# Patient Record
Sex: MALE | Race: WHITE | NOT HISPANIC OR LATINO | Employment: FULL TIME | URBAN - METROPOLITAN AREA
[De-identification: names, ages, dates, MRNs, and addresses within clinical notes are randomized per-mention and may not be internally consistent; named-entity substitution may affect disease eponyms.]

---

## 2023-03-01 PROBLEM — M47.816 FACET ARTHROPATHY, LUMBAR: Status: ACTIVE | Noted: 2023-03-01

## 2023-03-01 PROBLEM — M48.062 SPINAL STENOSIS OF LUMBAR REGION WITH NEUROGENIC CLAUDICATION: Status: ACTIVE | Noted: 2023-03-01

## 2023-03-01 PROBLEM — M79.641 PAIN OF RIGHT HAND: Status: ACTIVE | Noted: 2023-03-01

## 2023-03-01 PROBLEM — M51.369 DDD (DEGENERATIVE DISC DISEASE), LUMBAR: Status: ACTIVE | Noted: 2023-03-01

## 2023-03-01 PROBLEM — M51.26 LUMBAR DISC HERNIATION: Status: ACTIVE | Noted: 2023-03-01

## 2023-03-01 PROBLEM — L21.9 SEBORRHEIC DERMATITIS: Status: ACTIVE | Noted: 2023-03-01

## 2023-03-01 PROBLEM — M19.90 ARTHRITIS: Status: ACTIVE | Noted: 2023-03-01

## 2023-03-01 PROBLEM — R31.9 HEMATURIA: Status: ACTIVE | Noted: 2023-03-01

## 2023-03-01 PROBLEM — G56.22 CUBITAL TUNNEL SYNDROME ON LEFT: Status: ACTIVE | Noted: 2023-03-01

## 2023-03-01 PROBLEM — B02.9 SHINGLES: Status: ACTIVE | Noted: 2023-03-01

## 2023-03-01 PROBLEM — M54.50 LUMBAR BACK PAIN: Status: ACTIVE | Noted: 2023-03-01

## 2023-03-01 PROBLEM — M51.36 DDD (DEGENERATIVE DISC DISEASE), LUMBAR: Status: ACTIVE | Noted: 2023-03-01

## 2023-03-01 PROBLEM — S22.42XA MULTIPLE CLOSED FRACTURES OF RIBS OF LEFT SIDE: Status: ACTIVE | Noted: 2023-03-01

## 2023-03-01 PROBLEM — M72.0 DUPUYTREN'S CONTRACTURE OF HAND: Status: ACTIVE | Noted: 2023-03-01

## 2023-03-01 PROBLEM — L73.8 SEBACEOUS HYPERPLASIA OF FACE: Status: ACTIVE | Noted: 2023-03-01

## 2023-03-01 PROBLEM — M51.27 HERNIATED NUCLEUS PULPOSUS OF LUMBOSACRAL REGION: Status: ACTIVE | Noted: 2023-03-01

## 2023-03-01 PROBLEM — R21 PENILE RASH: Status: ACTIVE | Noted: 2023-03-01

## 2023-03-01 PROBLEM — M25.521 RIGHT ELBOW PAIN: Status: ACTIVE | Noted: 2023-03-01

## 2023-03-01 RX ORDER — NYSTATIN 100000 U/G
CREAM TOPICAL
COMMUNITY
Start: 2021-04-16

## 2023-03-01 RX ORDER — CLOTRIMAZOLE AND BETAMETHASONE DIPROPIONATE 10; .64 MG/G; MG/G
CREAM TOPICAL
COMMUNITY
Start: 2021-04-22

## 2023-03-20 ENCOUNTER — OFFICE VISIT (OUTPATIENT)
Dept: PRIMARY CARE | Facility: CLINIC | Age: 63
End: 2023-03-20
Payer: COMMERCIAL

## 2023-03-20 VITALS
HEART RATE: 82 BPM | OXYGEN SATURATION: 98 % | DIASTOLIC BLOOD PRESSURE: 76 MMHG | HEIGHT: 70 IN | WEIGHT: 144 LBS | TEMPERATURE: 100.2 F | SYSTOLIC BLOOD PRESSURE: 136 MMHG | BODY MASS INDEX: 20.62 KG/M2 | RESPIRATION RATE: 16 BRPM

## 2023-03-20 DIAGNOSIS — M54.50 LUMBAR BACK PAIN: ICD-10-CM

## 2023-03-20 DIAGNOSIS — R31.1 BENIGN ESSENTIAL MICROSCOPIC HEMATURIA: ICD-10-CM

## 2023-03-20 DIAGNOSIS — M48.062 SPINAL STENOSIS OF LUMBAR REGION WITH NEUROGENIC CLAUDICATION: Primary | ICD-10-CM

## 2023-03-20 PROBLEM — B02.9 SHINGLES: Status: RESOLVED | Noted: 2023-03-01 | Resolved: 2023-03-20

## 2023-03-20 PROBLEM — L73.8 SEBACEOUS HYPERPLASIA OF FACE: Status: RESOLVED | Noted: 2023-03-01 | Resolved: 2023-03-20

## 2023-03-20 PROBLEM — R21 PENILE RASH: Status: RESOLVED | Noted: 2023-03-01 | Resolved: 2023-03-20

## 2023-03-20 PROBLEM — S22.42XA MULTIPLE CLOSED FRACTURES OF RIBS OF LEFT SIDE: Status: RESOLVED | Noted: 2023-03-01 | Resolved: 2023-03-20

## 2023-03-20 PROBLEM — G56.22 CUBITAL TUNNEL SYNDROME ON LEFT: Status: RESOLVED | Noted: 2023-03-01 | Resolved: 2023-03-20

## 2023-03-20 PROBLEM — M51.26 LUMBAR DISC HERNIATION: Status: RESOLVED | Noted: 2023-03-01 | Resolved: 2023-03-20

## 2023-03-20 PROBLEM — L21.9 SEBORRHEIC DERMATITIS: Status: RESOLVED | Noted: 2023-03-01 | Resolved: 2023-03-20

## 2023-03-20 PROBLEM — M72.0 DUPUYTREN'S CONTRACTURE OF HAND: Status: RESOLVED | Noted: 2023-03-01 | Resolved: 2023-03-20

## 2023-03-20 PROBLEM — M79.641 PAIN OF RIGHT HAND: Status: RESOLVED | Noted: 2023-03-01 | Resolved: 2023-03-20

## 2023-03-20 PROBLEM — M51.27 HERNIATED NUCLEUS PULPOSUS OF LUMBOSACRAL REGION: Status: RESOLVED | Noted: 2023-03-01 | Resolved: 2023-03-20

## 2023-03-20 PROBLEM — M47.816 FACET ARTHROPATHY, LUMBAR: Status: RESOLVED | Noted: 2023-03-01 | Resolved: 2023-03-20

## 2023-03-20 PROBLEM — M25.521 RIGHT ELBOW PAIN: Status: RESOLVED | Noted: 2023-03-01 | Resolved: 2023-03-20

## 2023-03-20 PROCEDURE — 99213 OFFICE O/P EST LOW 20 MIN: CPT | Performed by: NURSE PRACTITIONER

## 2023-03-20 RX ORDER — CYCLOBENZAPRINE HCL 10 MG
10 TABLET ORAL 3 TIMES DAILY PRN
Qty: 30 TABLET | Refills: 0 | Status: SHIPPED | OUTPATIENT
Start: 2023-03-20 | End: 2023-03-30

## 2023-03-20 RX ORDER — PREDNISONE 10 MG/1
TABLET ORAL
Qty: 30 TABLET | Refills: 0 | Status: SHIPPED | OUTPATIENT
Start: 2023-03-20 | End: 2023-03-30

## 2023-03-20 RX ORDER — OXYCODONE AND ACETAMINOPHEN 5; 325 MG/1; MG/1
1 TABLET ORAL EVERY 8 HOURS PRN
Qty: 9 TABLET | Refills: 0 | Status: SHIPPED | OUTPATIENT
Start: 2023-03-20 | End: 2023-03-23

## 2023-03-20 RX ORDER — TRIAMCINOLONE ACETONIDE 40 MG/ML
80 INJECTION, SUSPENSION INTRA-ARTICULAR; INTRAMUSCULAR ONCE
Status: COMPLETED | OUTPATIENT
Start: 2023-03-20 | End: 2023-03-28

## 2023-03-20 ASSESSMENT — ENCOUNTER SYMPTOMS
EYES NEGATIVE: 1
CONSTITUTIONAL NEGATIVE: 1
BACK PAIN: 1
RESPIRATORY NEGATIVE: 1
CARDIOVASCULAR NEGATIVE: 1
GASTROINTESTINAL NEGATIVE: 1
HEMATURIA: 1

## 2023-03-20 ASSESSMENT — PAIN SCALES - GENERAL: PAINLEVEL: 6

## 2023-03-20 NOTE — PATIENT INSTRUCTIONS
1. Spinal stenosis of lumbar region with neurogenic claudication     Steroid shot today  Taper Steroid dose  Muscle relaxer  Pain Medication  If not improved then a referral back to Dr Conway     2. Benign essential microscopic hematuria     Checking blood work    3. Lumbar back pain     Steroid shot today  Taper Steroid dose  Muscle relaxer  Pain Medication  If not improved then a referral back to Dr Conway    I have ordered fasting blood work - please wait 3-4 weeks - I will call with results

## 2023-03-20 NOTE — PROGRESS NOTES
"Subjective   Patient ID: Siva Priest is a 62 y.o. male who presents for Annual Exam and Sciatica (pain).    Back Pain  Patient presents for evaluation of low back problems. Symptoms have been present for 8 months and include pain in left lower back and into the leg (burning, numbing, shooting, and tingling in character; 8/10 in severity at the worst average of 5 and goes away ) and paresthesias in left leg. Initial inciting event: none. Symptoms are worse with rain but time of day does not matter Alleviating factors identifiable by the patient are squatting and bending over. Aggravating factors identifiable by the patient are bending forwards, standing, and walking. Treatments initiated by the patient: ibuprofen, tylenol no real help inversion table helps a little. Previous lower back problems: right low back pain . Previous work up: none. Previous treatments: previous steroids and he had a microdiskectomy of the right L4-5 with Dr Conway           Review of Systems   Constitutional: Negative.    HENT: Negative.     Eyes: Negative.    Respiratory: Negative.     Cardiovascular: Negative.    Gastrointestinal: Negative.    Genitourinary:  Positive for hematuria.        Has been worked up for hematuria by Dr Valadez   Musculoskeletal:  Positive for back pain and gait problem.        As noted in HPI.   Skin: Negative.        Objective   /76   Pulse 82   Temp 37.9 °C (100.2 °F)   Resp 16   Ht 1.778 m (5' 10\")   Wt 65.3 kg (144 lb)   SpO2 98%   BMI 20.66 kg/m²     Physical Exam  HENT:      Head: Normocephalic and atraumatic.   Eyes:      Conjunctiva/sclera: Conjunctivae normal.   Cardiovascular:      Rate and Rhythm: Normal rate and regular rhythm.      Pulses: Normal pulses.   Pulmonary:      Effort: Pulmonary effort is normal. No respiratory distress.      Breath sounds: No wheezing.   Abdominal:      General: Abdomen is flat. Bowel sounds are normal.   Musculoskeletal:         General: Tenderness " present.      Lumbar back: Decreased range of motion.      Comments: Restricted forward flexion Rest of ROM WNL   Neurological:      General: No focal deficit present.      Mental Status: He is alert and oriented to person, place, and time.       Assessment/Plan   Problem List Items Addressed This Visit          Musculoskeletal    Spinal stenosis of lumbar region with neurogenic claudication - Primary       Other    Hematuria    Relevant Orders    Comprehensive Metabolic Panel    Prostate Specific Antigen    TSH with reflex to Free T4 if abnormal    Lipid Panel    CBC and Auto Differential    Lumbar back pain    Relevant Medications    triamcinolone acetonide (Kenalog-40) injection 80 mg    predniSONE (Deltasone) 10 mg tablet    cyclobenzaprine (Flexeril) 10 mg tablet    oxyCODONE-acetaminophen (Percocet) 5-325 mg tablet    Other Relevant Orders    Referral to Physical Therapy

## 2023-03-28 PROCEDURE — 96372 THER/PROPH/DIAG INJ SC/IM: CPT | Performed by: NURSE PRACTITIONER

## 2023-03-28 RX ADMIN — TRIAMCINOLONE ACETONIDE 80 MG: 40 INJECTION, SUSPENSION INTRA-ARTICULAR; INTRAMUSCULAR at 09:58

## 2023-07-12 LAB
ALANINE AMINOTRANSFERASE (SGPT) (U/L) IN SER/PLAS: 10 U/L (ref 10–52)
ALBUMIN (G/DL) IN SER/PLAS: 4.5 G/DL (ref 3.4–5)
ALKALINE PHOSPHATASE (U/L) IN SER/PLAS: 40 U/L (ref 33–136)
ANION GAP IN SER/PLAS: 15 MMOL/L (ref 10–20)
ASPARTATE AMINOTRANSFERASE (SGOT) (U/L) IN SER/PLAS: 18 U/L (ref 9–39)
BASOPHILS (10*3/UL) IN BLOOD BY AUTOMATED COUNT: 0.05 X10E9/L (ref 0–0.1)
BASOPHILS/100 LEUKOCYTES IN BLOOD BY AUTOMATED COUNT: 1.2 % (ref 0–2)
BILIRUBIN TOTAL (MG/DL) IN SER/PLAS: 0.8 MG/DL (ref 0–1.2)
CALCIUM (MG/DL) IN SER/PLAS: 9.3 MG/DL (ref 8.6–10.3)
CARBON DIOXIDE, TOTAL (MMOL/L) IN SER/PLAS: 27 MMOL/L (ref 21–32)
CHLORIDE (MMOL/L) IN SER/PLAS: 102 MMOL/L (ref 98–107)
CHOLESTEROL (MG/DL) IN SER/PLAS: 192 MG/DL (ref 0–199)
CHOLESTEROL IN HDL (MG/DL) IN SER/PLAS: 67.1 MG/DL
CHOLESTEROL/HDL RATIO: 2.9
CREATININE (MG/DL) IN SER/PLAS: 0.81 MG/DL (ref 0.5–1.3)
EOSINOPHILS (10*3/UL) IN BLOOD BY AUTOMATED COUNT: 0.27 X10E9/L (ref 0–0.7)
EOSINOPHILS/100 LEUKOCYTES IN BLOOD BY AUTOMATED COUNT: 6.4 % (ref 0–6)
ERYTHROCYTE DISTRIBUTION WIDTH (RATIO) BY AUTOMATED COUNT: 12.6 % (ref 11.5–14.5)
ERYTHROCYTE MEAN CORPUSCULAR HEMOGLOBIN CONCENTRATION (G/DL) BY AUTOMATED: 33.3 G/DL (ref 32–36)
ERYTHROCYTE MEAN CORPUSCULAR VOLUME (FL) BY AUTOMATED COUNT: 97 FL (ref 80–100)
ERYTHROCYTES (10*6/UL) IN BLOOD BY AUTOMATED COUNT: 5.06 X10E12/L (ref 4.5–5.9)
GFR MALE: >90 ML/MIN/1.73M2
GLUCOSE (MG/DL) IN SER/PLAS: 113 MG/DL (ref 74–99)
HEMATOCRIT (%) IN BLOOD BY AUTOMATED COUNT: 49.3 % (ref 41–52)
HEMOGLOBIN (G/DL) IN BLOOD: 16.4 G/DL (ref 13.5–17.5)
IMMATURE GRANULOCYTES/100 LEUKOCYTES IN BLOOD BY AUTOMATED COUNT: 0.2 % (ref 0–0.9)
LDL: 114 MG/DL (ref 0–99)
LEUKOCYTES (10*3/UL) IN BLOOD BY AUTOMATED COUNT: 4.2 X10E9/L (ref 4.4–11.3)
LYMPHOCYTES (10*3/UL) IN BLOOD BY AUTOMATED COUNT: 1.44 X10E9/L (ref 1.2–4.8)
LYMPHOCYTES/100 LEUKOCYTES IN BLOOD BY AUTOMATED COUNT: 34 % (ref 13–44)
MONOCYTES (10*3/UL) IN BLOOD BY AUTOMATED COUNT: 0.33 X10E9/L (ref 0.1–1)
MONOCYTES/100 LEUKOCYTES IN BLOOD BY AUTOMATED COUNT: 7.8 % (ref 2–10)
NEUTROPHILS (10*3/UL) IN BLOOD BY AUTOMATED COUNT: 2.13 X10E9/L (ref 1.2–7.7)
NEUTROPHILS/100 LEUKOCYTES IN BLOOD BY AUTOMATED COUNT: 50.4 % (ref 40–80)
PLATELETS (10*3/UL) IN BLOOD AUTOMATED COUNT: 226 X10E9/L (ref 150–450)
POTASSIUM (MMOL/L) IN SER/PLAS: 4.5 MMOL/L (ref 3.5–5.3)
PROSTATE SPECIFIC AG (NG/ML) IN SER/PLAS: 0.22 NG/ML (ref 0–4)
PROTEIN TOTAL: 7.1 G/DL (ref 6.4–8.2)
SODIUM (MMOL/L) IN SER/PLAS: 139 MMOL/L (ref 136–145)
THYROTROPIN (MIU/L) IN SER/PLAS BY DETECTION LIMIT <= 0.05 MIU/L: 1.07 MIU/L (ref 0.44–3.98)
TRIGLYCERIDE (MG/DL) IN SER/PLAS: 54 MG/DL (ref 0–149)
UREA NITROGEN (MG/DL) IN SER/PLAS: 12 MG/DL (ref 6–23)
VLDL: 11 MG/DL (ref 0–40)

## 2023-07-19 ENCOUNTER — TELEPHONE (OUTPATIENT)
Dept: PRIMARY CARE | Facility: CLINIC | Age: 63
End: 2023-07-19
Payer: COMMERCIAL

## 2023-07-19 NOTE — TELEPHONE ENCOUNTER
----- Message from Kenya Luna MD sent at 7/17/2023  5:39 PM EDT -----  All labs were essentially within normal limits.

## 2023-08-07 ENCOUNTER — TELEPHONE (OUTPATIENT)
Dept: PRIMARY CARE | Facility: CLINIC | Age: 63
End: 2023-08-07
Payer: COMMERCIAL

## 2023-08-07 DIAGNOSIS — M54.16 LUMBAR RADICULOPATHY: Primary | ICD-10-CM

## 2023-08-07 NOTE — TELEPHONE ENCOUNTER
He is having Sciatic pain on his left side and is asking for a referral to Dr. Mcleod.     He had seen his a few years ago for his Sciatic pain on the right side and he was able to repair it.

## 2023-08-10 ENCOUNTER — APPOINTMENT (OUTPATIENT)
Dept: PRIMARY CARE | Facility: CLINIC | Age: 63
End: 2023-08-10
Payer: COMMERCIAL

## 2023-09-08 ENCOUNTER — APPOINTMENT (OUTPATIENT)
Dept: PRIMARY CARE | Facility: CLINIC | Age: 63
End: 2023-09-08
Payer: COMMERCIAL

## 2023-09-28 ENCOUNTER — OFFICE VISIT (OUTPATIENT)
Dept: PRIMARY CARE | Facility: CLINIC | Age: 63
End: 2023-09-28
Payer: COMMERCIAL

## 2023-09-28 VITALS
WEIGHT: 140.2 LBS | DIASTOLIC BLOOD PRESSURE: 79 MMHG | SYSTOLIC BLOOD PRESSURE: 117 MMHG | RESPIRATION RATE: 18 BRPM | HEART RATE: 74 BPM | BODY MASS INDEX: 20.12 KG/M2 | TEMPERATURE: 98.8 F | OXYGEN SATURATION: 95 %

## 2023-09-28 DIAGNOSIS — Z00.00 HEALTH MAINTENANCE EXAMINATION: Primary | ICD-10-CM

## 2023-09-28 DIAGNOSIS — F17.219 CIGARETTE NICOTINE DEPENDENCE WITH NICOTINE-INDUCED DISORDER: ICD-10-CM

## 2023-09-28 PROCEDURE — 99396 PREV VISIT EST AGE 40-64: CPT | Performed by: FAMILY MEDICINE

## 2023-09-28 PROCEDURE — 4004F PT TOBACCO SCREEN RCVD TLK: CPT | Performed by: FAMILY MEDICINE

## 2023-09-28 ASSESSMENT — ENCOUNTER SYMPTOMS
CONSTITUTIONAL NEGATIVE: 1
EYES NEGATIVE: 1
ENDOCRINE NEGATIVE: 1
HEMATOLOGIC/LYMPHATIC NEGATIVE: 1
NEUROLOGICAL NEGATIVE: 1
PSYCHIATRIC NEGATIVE: 1
CARDIOVASCULAR NEGATIVE: 1
BACK PAIN: 1
RESPIRATORY NEGATIVE: 1
ALLERGIC/IMMUNOLOGIC NEGATIVE: 1
GASTROINTESTINAL NEGATIVE: 1

## 2023-09-28 NOTE — PROGRESS NOTES
Subjective   Patient ID: Siva Priest is a 63 y.o. male who presents for No chief complaint on file..    Patient here for yearly exam. No concerns.     Is having Back surgery November 7th, bone spur removal to take pressure off sciatica.  Has been going to physical therapy since April, has gotten better strength wise but pain remains.     States had colonoscopy 2 years ago.   Would like lose dose CT scan, denies wanting to stop smoking. 20 pack year smoker. Educated on smoking cessation,     Denies influenza and pneumonia vaccine.          Review of Systems   Constitutional: Negative.    HENT: Negative.     Eyes: Negative.    Respiratory: Negative.     Cardiovascular: Negative.    Gastrointestinal: Negative.    Endocrine: Negative.    Genitourinary: Negative.    Musculoskeletal:  Positive for back pain.        See HPI   Skin: Negative.    Allergic/Immunologic: Negative.    Neurological: Negative.    Hematological: Negative.    Psychiatric/Behavioral: Negative.         Objective   /79   Pulse 74   Temp 37.1 °C (98.8 °F)   Resp 18   Wt 63.6 kg (140 lb 3.2 oz)   SpO2 95%   BMI 20.12 kg/m²     Physical Exam  Constitutional:       Appearance: Normal appearance.   HENT:      Head: Normocephalic.   Cardiovascular:      Rate and Rhythm: Normal rate and regular rhythm.      Pulses: Normal pulses.      Heart sounds: Normal heart sounds.   Pulmonary:      Effort: Pulmonary effort is normal.      Breath sounds: Normal breath sounds.   Abdominal:      General: Bowel sounds are normal.      Palpations: Abdomen is soft.   Musculoskeletal:         General: Normal range of motion.   Skin:     General: Skin is warm.      Capillary Refill: Capillary refill takes less than 2 seconds.   Neurological:      General: No focal deficit present.      Mental Status: He is alert and oriented to person, place, and time. Mental status is at baseline.   Psychiatric:         Mood and Affect: Mood normal.         Behavior:  Behavior normal.         Thought Content: Thought content normal.         Judgment: Judgment normal.       This note was completed by Madyson Hand FNP student acting as a scribe for Kirsten Landers CNP    Assessment/Plan   Problem List Items Addressed This Visit    None  Visit Diagnoses         Codes    Health maintenance examination    -  Primary Z00.00    Cigarette nicotine dependence with nicotine-induced disorder     F17.219    Relevant Orders    CT lung screening low dose

## 2023-10-31 ENCOUNTER — TELEPHONE (OUTPATIENT)
Dept: PRIMARY CARE | Facility: CLINIC | Age: 63
End: 2023-10-31
Payer: COMMERCIAL

## 2023-10-31 NOTE — TELEPHONE ENCOUNTER
----- Message from TREY Armendariz-CNP sent at 10/31/2023  1:54 PM EDT -----  I have received a request from [facility] for preop clearance for his surgery on 11/7 however the clearance form had wrong patients information, can you call them and have them fax a corrected clearance form?

## 2023-11-02 ENCOUNTER — DOCUMENTATION (OUTPATIENT)
Dept: PRIMARY CARE | Facility: CLINIC | Age: 63
End: 2023-11-02
Payer: COMMERCIAL

## 2023-11-02 LAB
ANION GAP IN SER/PLAS EXTERNAL: 10 MMOL/L
CALCIUM (MG/DL) IN SER/PLAS EXTERNAL: 8.7 MG/DL
CARBON DIOXIDE, TOTAL (MMOL/L) IN SER/PLAS EXTERNAL: 26 MMOL/L
CHLORIDE (MMOL/L) IN SER/PLAS EXTERNAL: 102 MMOL/L
CREATININE (MG/DL) IN SER/PLAS EXTERNAL: 0.88 MG/DL
ERYTHROCYTE MEAN CORPUSCULAR HEMOGLOBIN (PG) BY AUTOMATED COUNT EXTERNAL: 35.3 PG
ERYTHROCYTE MEAN CORPUSCULAR HGB CONCENTRATION (G/DL) BY AUTOMATED EXT: 33.3 G/DL
ERYTHROCYTE MEAN CORPUSCULAR VOLUME (FL) BY AUTOMATED COUNT EXTERNAL: 106 FL
ERYTHROCYTES (10*6/UL) IN BLOOD BY AUTOMATED COUNT EXTERNAL: 4.48 X10*6/UL
GLUCOSE (MG/DL) IN SER/PLAS EXTERNAL: 79 MG/DL
HEMATOCRIT (%) IN BLOOD BY AUTOMATED COUNT EXTERNAL: 47.4 %
HEMOGLOBIN (G/DL) IN BLOOD EXTERNAL: 15.8 G/DL
LEUKOCYTES (10*3/UL) IN BLOOD BY AUTOMATED COUNT EXTERNAL: 4.3 X10*3/UL
PLATELET MEAN VOLUME (FL) IN BLOOD BY AUTOMATED COUNT EXTERNAL: 8.1 FL
PLATELETS (10*3/UL) IN BLOOD AUTOMATED COUNT EXTERNAL: 175 X10*3/UL
POTASSIUM (MMOL/L) IN SER/PLAS EXTERNAL: 4.7 MMOL/L
SODIUM (MMOL/L) IN SER/PLAS EXTERNAL: 138 MMOL/L
UREA NITROGEN (MG/DL) IN SER/PLAS EXTERNAL: 11 MG/DL

## 2025-04-08 ENCOUNTER — HOSPITAL ENCOUNTER (OUTPATIENT)
Dept: RADIOLOGY | Facility: HOSPITAL | Age: 65
Discharge: HOME | End: 2025-04-08
Payer: COMMERCIAL

## 2025-04-08 ENCOUNTER — APPOINTMENT (OUTPATIENT)
Dept: PRIMARY CARE | Facility: CLINIC | Age: 65
End: 2025-04-08
Payer: COMMERCIAL

## 2025-04-08 VITALS
HEART RATE: 98 BPM | BODY MASS INDEX: 19.5 KG/M2 | HEIGHT: 70 IN | WEIGHT: 136.2 LBS | OXYGEN SATURATION: 99 % | RESPIRATION RATE: 16 BRPM | SYSTOLIC BLOOD PRESSURE: 128 MMHG | DIASTOLIC BLOOD PRESSURE: 80 MMHG

## 2025-04-08 DIAGNOSIS — Z72.0 TOBACCO USER: ICD-10-CM

## 2025-04-08 DIAGNOSIS — R73.9 ELEVATED RANDOM BLOOD GLUCOSE LEVEL: Primary | ICD-10-CM

## 2025-04-08 DIAGNOSIS — F10.90 ALCOHOL USE: ICD-10-CM

## 2025-04-08 DIAGNOSIS — R10.84 GENERALIZED ABDOMINAL PAIN: ICD-10-CM

## 2025-04-08 DIAGNOSIS — Z12.11 COLON CANCER SCREENING: ICD-10-CM

## 2025-04-08 DIAGNOSIS — R35.1 NOCTURIA: ICD-10-CM

## 2025-04-08 DIAGNOSIS — E78.2 MIXED HYPERLIPIDEMIA: ICD-10-CM

## 2025-04-08 DIAGNOSIS — Z00.00 HEALTH CARE MAINTENANCE: ICD-10-CM

## 2025-04-08 PROCEDURE — 99396 PREV VISIT EST AGE 40-64: CPT

## 2025-04-08 PROCEDURE — 3008F BODY MASS INDEX DOCD: CPT

## 2025-04-08 PROCEDURE — 76700 US EXAM ABDOM COMPLETE: CPT | Performed by: RADIOLOGY

## 2025-04-08 PROCEDURE — 4004F PT TOBACCO SCREEN RCVD TLK: CPT

## 2025-04-08 PROCEDURE — 76700 US EXAM ABDOM COMPLETE: CPT

## 2025-04-08 PROCEDURE — 99214 OFFICE O/P EST MOD 30 MIN: CPT

## 2025-04-08 RX ORDER — OMEPRAZOLE 20 MG/1
20 CAPSULE, DELAYED RELEASE ORAL DAILY
Qty: 30 CAPSULE | Refills: 1 | Status: SHIPPED | OUTPATIENT
Start: 2025-04-08 | End: 2025-06-07

## 2025-04-08 ASSESSMENT — ENCOUNTER SYMPTOMS
NERVOUS/ANXIOUS: 1
ENDOCRINE NEGATIVE: 1
ABDOMINAL PAIN: 1
ROS GI COMMENTS: SEE HPI
HEMATOLOGIC/LYMPHATIC NEGATIVE: 1
OCCASIONAL FEELINGS OF UNSTEADINESS: 0
CONSTITUTIONAL NEGATIVE: 1
DEPRESSION: 0
MUSCULOSKELETAL NEGATIVE: 1
LOSS OF SENSATION IN FEET: 0
DIARRHEA: 1
RESPIRATORY NEGATIVE: 1
CARDIOVASCULAR NEGATIVE: 1
ALLERGIC/IMMUNOLOGIC NEGATIVE: 1
EYES NEGATIVE: 1
NEUROLOGICAL NEGATIVE: 1

## 2025-04-08 ASSESSMENT — PATIENT HEALTH QUESTIONNAIRE - PHQ9
10. IF YOU CHECKED OFF ANY PROBLEMS, HOW DIFFICULT HAVE THESE PROBLEMS MADE IT FOR YOU TO DO YOUR WORK, TAKE CARE OF THINGS AT HOME, OR GET ALONG WITH OTHER PEOPLE: SOMEWHAT DIFFICULT
SUM OF ALL RESPONSES TO PHQ9 QUESTIONS 1 AND 2: 2
2. FEELING DOWN, DEPRESSED OR HOPELESS: SEVERAL DAYS
1. LITTLE INTEREST OR PLEASURE IN DOING THINGS: SEVERAL DAYS

## 2025-04-08 ASSESSMENT — ANXIETY QUESTIONNAIRES
3. WORRYING TOO MUCH ABOUT DIFFERENT THINGS: SEVERAL DAYS
GAD7 TOTAL SCORE: 6
6. BECOMING EASILY ANNOYED OR IRRITABLE: SEVERAL DAYS
1. FEELING NERVOUS, ANXIOUS, OR ON EDGE: SEVERAL DAYS
IF YOU CHECKED OFF ANY PROBLEMS ON THIS QUESTIONNAIRE, HOW DIFFICULT HAVE THESE PROBLEMS MADE IT FOR YOU TO DO YOUR WORK, TAKE CARE OF THINGS AT HOME, OR GET ALONG WITH OTHER PEOPLE: NOT DIFFICULT AT ALL
7. FEELING AFRAID AS IF SOMETHING AWFUL MIGHT HAPPEN: SEVERAL DAYS
2. NOT BEING ABLE TO STOP OR CONTROL WORRYING: SEVERAL DAYS
4. TROUBLE RELAXING: SEVERAL DAYS
5. BEING SO RESTLESS THAT IT IS HARD TO SIT STILL: NOT AT ALL

## 2025-04-08 ASSESSMENT — PAIN SCALES - GENERAL: PAINLEVEL_OUTOF10: 6

## 2025-04-08 ASSESSMENT — COLUMBIA-SUICIDE SEVERITY RATING SCALE - C-SSRS
6. HAVE YOU EVER DONE ANYTHING, STARTED TO DO ANYTHING, OR PREPARED TO DO ANYTHING TO END YOUR LIFE?: NO
1. IN THE PAST MONTH, HAVE YOU WISHED YOU WERE DEAD OR WISHED YOU COULD GO TO SLEEP AND NOT WAKE UP?: NO
2. HAVE YOU ACTUALLY HAD ANY THOUGHTS OF KILLING YOURSELF?: NO

## 2025-04-08 NOTE — ASSESSMENT & PLAN NOTE
Blood work ordered.   Colonoscopy ordered  Declines recommended immunizations  Tdap 2018    During the course of the visit the patient was educated and counseled about age appropriate screening and preventive services. Completed preventive screenings were documented in the chart and orders were placed for outstanding screenings/procedures as documented in the Assessment and Plan.     Patient Instructions (the written plan) was given to the patient at check out that include any community based lifestyle interventions.     Other risk factors and conditions for which interventions are recommended are addressed as the other tagged diagnoses in this encounter.

## 2025-04-08 NOTE — ASSESSMENT & PLAN NOTE
"3 months ago, was at a friends house and had peppers/onions the next day stomach pain/bloating for the next two days with stools that look like \"Chew tobacco\". Some days can eat just fine without a problem steak, hamburger, other days can only tolerate english muffin. Mid upper abdomen, pain does not radiate, bloating/gassy describes it at feeling heavy, with either daily loose stools or soft happens daily. On average two stools daily.   Has been taking Mylanta for the past few days, has helped some. Has tried gasx without any effect.  Denies blood in stools. Has notices mucous in stools. Denies cramping pain.   Upon assessment bowels hyperactive, RUQ pain with palpitation.   Lab work ordered, abdominal ultrasound ordered.   Omeprazole 20mg for 6 weeks ordered, diet discussed. [place] diet discussed, avoid spicy, fatty foods, raw vegetables, caffeine, alcohol, fired foods.   "

## 2025-04-08 NOTE — PATIENT INSTRUCTIONS
I want you to take the omeprazole every morning for 6 weeks 30 mins prior to first meal. We will follow up in 8 weeks.   Ultrasound of Abdomen ordered.   [place] diet discussed, avoid spicy, fatty foods, raw vegetables, caffeine, alcohol, fired foods.     I am ordering labs for you to get when you are fasting (meaning nothing to eat or drink for at least 12 hours before, water and black coffee are okay). Once we get the results, someone from the office will call you. If you do not hear from someone within one week of having your blood drawn, please call us [phone] so that we can go over the results.    It is recommend that you consume a balanced diet to include: fruits, a variety of vegetables (dark green, red and orange, legumes like beans and peas, starch, other), grains (at least half of which are whole grains), fat-free or low-fat dairy including milk,cheese, or fortified soy beverages, and proteins such as lean means, poultry, fish, eggs, nuts, seeds.   Limit processed foods, saturated and trans fats, added sugars and sodium (salt).     It is recommended that you get at least 150min exercise every week. More is even better. Moderate activities are activities that get your heart beating faster but you are still able to carry on a conversation. Vigorous activities will have you take breaths after a few words. You should also include two days of muscle strengthening activities to include all major muscle groups (arms, shoulders, chest, abdomen, back, hips and legs)    Benefits of keeping active include:  Lowering your risk of developing type II diabetes and some cancers  Control your blood pressure  Maintain a healthy weight  Improving mood and managing stress  Improving sleep

## 2025-04-08 NOTE — PROGRESS NOTES
"Subjective   Patient ID: Siva Priest is a 64 y.o. male who presents for Annual Exam and Bloated (Nausea after couple bites loose stool x 3 months).    Past Medical, Surgical, and Family History reviewed and updated in chart.     Reviewed all medications by prescribing practitioner or clinical pharmacist (such as prescriptions, OTCs, herbal therapies and supplements) and documented in the medical record.    HPI   64 yom in office to establish care and annual exam. PMH of spinal stenosis of lumbar spine.   Due for blood work.   Endorses colonoscopy maybe 6-7 years ago, there is no records on file.   3 months ago, was at a friends house and had peppers/onions the next day stomach pain/bloating for the next two days with stools that look like \"Chew tobacco\". Some days can eat just fine without a problem steak, hamburger, other days can only tolerate english muffin. Mid upper abdomen, pain does not radiate, bloating/gassy describes it at feeling heavy, with either daily loose stools or soft happens daily. On average two stools daily.   Has been taking Mylanta for the past few days, has helped some. Has tried gasx without any effect.  Denies blood in stools. Has notices mucous in stools. Denies cramping pain.     Anxiety mainly right before bed, it has gotten better since retired. Does not feel it is bad enough he would like to be on medication or CBT. Did discuss coping mechanisms with patient.     Review of Systems   Constitutional: Negative.    HENT: Negative.     Eyes: Negative.    Respiratory: Negative.     Cardiovascular: Negative.    Gastrointestinal:  Positive for abdominal pain and diarrhea.        See HPI   Endocrine: Negative.    Genitourinary: Negative.    Musculoskeletal: Negative.    Skin: Negative.    Allergic/Immunologic: Negative.    Neurological: Negative.    Hematological: Negative.    Psychiatric/Behavioral:  The patient is nervous/anxious.    All other systems reviewed and are " "negative.      Objective   /80 (BP Location: Left arm, Patient Position: Sitting, BP Cuff Size: Adult)   Pulse 98   Resp 16   Ht 1.778 m (5' 10\")   Wt 61.8 kg (136 lb 3.2 oz)   SpO2 99%   BMI 19.54 kg/m²     Physical Exam  Constitutional:       Appearance: Normal appearance.   HENT:      Head: Normocephalic and atraumatic.      Nose: Nose normal.      Mouth/Throat:      Mouth: Mucous membranes are moist.      Pharynx: Oropharynx is clear.   Eyes:      Pupils: Pupils are equal, round, and reactive to light.   Cardiovascular:      Rate and Rhythm: Normal rate and regular rhythm.      Pulses: Normal pulses.      Heart sounds: Normal heart sounds.   Pulmonary:      Effort: Pulmonary effort is normal.      Breath sounds: Normal breath sounds.   Abdominal:      General: Bowel sounds are increased.      Tenderness: There is generalized abdominal tenderness and tenderness in the right upper quadrant.   Musculoskeletal:         General: Normal range of motion.      Cervical back: Normal range of motion.   Skin:     General: Skin is warm and dry.   Neurological:      General: No focal deficit present.      Mental Status: He is alert and oriented to person, place, and time.   Psychiatric:         Mood and Affect: Mood normal.         Behavior: Behavior normal.         Thought Content: Thought content normal.         Judgment: Judgment normal.         Assessment/Plan   Problem List Items Addressed This Visit       Generalized abdominal pain     3 months ago, was at a friends house and had peppers/onions the next day stomach pain/bloating for the next two days with stools that look like \"Chew tobacco\". Some days can eat just fine without a problem steak, hamburger, other days can only tolerate english muffin. Mid upper abdomen, pain does not radiate, bloating/gassy describes it at feeling heavy, with either daily loose stools or soft happens daily. On average two stools daily.   Has been taking Mylanta for the past " few days, has helped some. Has tried gasx without any effect.  Denies blood in stools. Has notices mucous in stools. Denies cramping pain.   Upon assessment bowels hyperactive, RUQ pain with palpitation.   Lab work ordered, abdominal ultrasound ordered.   Omeprazole 20mg for 6 weeks ordered, diet discussed. [place] diet discussed, avoid spicy, fatty foods, raw vegetables, caffeine, alcohol, fired foods.          Relevant Medications    omeprazole (PriLOSEC) 20 mg DR capsule    Other Relevant Orders    US abdomen complete    Follow Up In [facility] - PCP - Established    Health care maintenance     Blood work ordered.   Colonoscopy ordered  Declines recommended immunizations  Tdap 2018    During the course of the visit the patient was educated and counseled about age appropriate screening and preventive services. Completed preventive screenings were documented in the chart and orders were placed for outstanding screenings/procedures as documented in the Assessment and Plan.     Patient Instructions (the written plan) was given to the patient at check out that include any community based lifestyle interventions.     Other risk factors and conditions for which interventions are recommended are addressed as the other tagged diagnoses in this encounter.          Relevant Orders    Follow Up In [facility] - PCP - Established    Tobacco user     CT lung screening ordered. Smoking cessation discussed.          Relevant Orders    CT lung screening low dose    Alcohol use     Other Visit Diagnoses       Elevated random blood glucose level    -  Primary    Relevant Orders    Hemoglobin A1C    Mixed hyperlipidemia        Relevant Orders    Lipid Panel    TSH with reflex to Free T4 if abnormal    Body mass index (BMI) 19.9 or less, adult        Relevant Orders    CBC and Auto Differential    Comprehensive Metabolic Panel    Nocturia        Relevant Orders    PSA, total and free    Colon cancer screening         Relevant Orders    Colonoscopy Screening; Average Risk Patient          Patient Counseling:  --Nutrition: Stressed importance of moderation in sodium/caffeine intake, saturated fat and cholesterol, caloric balance, sufficient intake of fresh fruits, vegetables, fiber, calcium, iron, and 1 mg of folate supplement per day (for females capable of pregnancy).  --Exercise: Stressed the importance of regular exercise.   --Substance Abuse: Discussed cessation/primary prevention of tobacco, alcohol, or other drug use; driving or other dangerous activities under the influence; availability of treatment for abuse.    --Sexuality: Discussed sexually transmitted diseases, partner selection, use of condoms, avoidance of unintended pregnancy  and contraceptive alternatives.   --Injury prevention: Discussed safety belts, safety helmets, smoke detector, smoking near bedding or upholstery.   --Dental health: Discussed importance of regular tooth brushing, flossing, and dental visits.  --Immunizations reviewed.  --Discussed benefits of screening colonoscopy.  --After hours service discussed with patient

## 2025-04-09 ENCOUNTER — TELEPHONE (OUTPATIENT)
Facility: CLINIC | Age: 65
End: 2025-04-09
Payer: COMMERCIAL

## 2025-04-09 LAB
ALBUMIN SERPL-MCNC: 5.6 G/DL (ref 3.6–5.1)
ALP SERPL-CCNC: 43 U/L (ref 35–144)
ALT SERPL-CCNC: 23 U/L (ref 9–46)
ANION GAP SERPL CALCULATED.4IONS-SCNC: 11 MMOL/L (CALC) (ref 7–17)
AST SERPL-CCNC: 28 U/L (ref 10–35)
BASOPHILS # BLD AUTO: 38 CELLS/UL (ref 0–200)
BASOPHILS NFR BLD AUTO: 0.7 %
BILIRUB SERPL-MCNC: 0.8 MG/DL (ref 0.2–1.2)
BUN SERPL-MCNC: 10 MG/DL (ref 7–25)
CALCIUM SERPL-MCNC: 10.5 MG/DL (ref 8.6–10.3)
CHLORIDE SERPL-SCNC: 99 MMOL/L (ref 98–110)
CHOLEST SERPL-MCNC: 205 MG/DL
CHOLEST/HDLC SERPL: 2.9 (CALC)
CO2 SERPL-SCNC: 29 MMOL/L (ref 20–32)
CREAT SERPL-MCNC: 0.89 MG/DL (ref 0.7–1.35)
EGFRCR SERPLBLD CKD-EPI 2021: 96 ML/MIN/1.73M2
EOSINOPHIL # BLD AUTO: 119 CELLS/UL (ref 15–500)
EOSINOPHIL NFR BLD AUTO: 2.2 %
ERYTHROCYTE [DISTWIDTH] IN BLOOD BY AUTOMATED COUNT: 12.2 % (ref 11–15)
EST. AVERAGE GLUCOSE BLD GHB EST-MCNC: 100 MG/DL
EST. AVERAGE GLUCOSE BLD GHB EST-SCNC: 5.5 MMOL/L
GLUCOSE SERPL-MCNC: 97 MG/DL (ref 65–99)
HBA1C MFR BLD: 5.1 % OF TOTAL HGB
HCT VFR BLD AUTO: 51.5 % (ref 38.5–50)
HDLC SERPL-MCNC: 70 MG/DL
HGB BLD-MCNC: 18 G/DL (ref 13.2–17.1)
LDLC SERPL CALC-MCNC: 116 MG/DL (CALC)
LYMPHOCYTES # BLD AUTO: 1172 CELLS/UL (ref 850–3900)
LYMPHOCYTES NFR BLD AUTO: 21.7 %
MCH RBC QN AUTO: 33.7 PG (ref 27–33)
MCHC RBC AUTO-ENTMCNC: 35 G/DL (ref 32–36)
MCV RBC AUTO: 96.4 FL (ref 80–100)
MONOCYTES # BLD AUTO: 508 CELLS/UL (ref 200–950)
MONOCYTES NFR BLD AUTO: 9.4 %
NEUTROPHILS # BLD AUTO: 3564 CELLS/UL (ref 1500–7800)
NEUTROPHILS NFR BLD AUTO: 66 %
NONHDLC SERPL-MCNC: 135 MG/DL (CALC)
PLATELET # BLD AUTO: 230 THOUSAND/UL (ref 140–400)
PMV BLD REES-ECKER: 10.7 FL (ref 7.5–12.5)
POTASSIUM SERPL-SCNC: 4.1 MMOL/L (ref 3.5–5.3)
PROT SERPL-MCNC: 8.8 G/DL (ref 6.1–8.1)
PSA FREE MFR SERPL: 50 % (CALC)
PSA FREE SERPL-MCNC: 0.1 NG/ML
PSA SERPL-MCNC: 0.2 NG/ML
RBC # BLD AUTO: 5.34 MILLION/UL (ref 4.2–5.8)
SODIUM SERPL-SCNC: 139 MMOL/L (ref 135–146)
TRIGL SERPL-MCNC: 86 MG/DL
TSH SERPL-ACNC: 1.83 MIU/L (ref 0.4–4.5)
WBC # BLD AUTO: 5.4 THOUSAND/UL (ref 3.8–10.8)

## 2025-04-10 NOTE — TELEPHONE ENCOUNTER
[facility] OPEN ACCESS COLONOSCOPY SCREENING FORM       Last Colonoscopy? Where: can't remember where  When: 4-5 years ago  ANESTHESIA SCREENING   1. Height 5'10     Weight 136  BMI 19.54    (Clinic Visit if BMI over 42)   2. Are you on any blood thinning medications including  mg? no  ( Clinic visit if yes)  3. Are you on oxygen at home? no (Clinic visit if yes)   4. Have you seen your primary care provider within the last 12 months? 4.8.2025 (Clinic visit if NO)   5. History of:   Pacemaker/Defibrillator no                          Heart Failure no                         Heart Disease no                          Stroke no   Details of cardiac history: no  (Clinic visit if history of heart failure or new diagnosis/new intervention in last year)     6. Do you have renal failure or require dialysis? no  7. Do you have sleep Apnea? yes  8. Are you on insulin for diabetes? no If yes, patient should discuss jovany-procedural medication adjustment with PCP.   9. Are you currently on GLP-1 or SGLT2 inhibitors? no  10. Do you have a history of seizures? no (If YES- indicate recent or NOT recent. Anesthesia to review if recent.)  11.) Do you have a history of Drug/Alcohol Abuse? Alcohol- beer daily, marijuana  (If YES- indicate how recent. If within the last year Patient needs to be seen in the office)    GI SCREENING   Have you had a positive stool based screening test? (i.e. fecal occult, FIT, or Cologuard) no   ? If yes and pass anesthesia screen, no further questions are needed. Schedule OA procedure.   ? If yes and they do NOT pass anesthesia screen then refer to office for expedited review/visit.   ? If no, proceed to the following GI screening questions to determine if office visit is needed.      If patient answers YES to any of the following please send to the office for an appointment.  1. Do you have chronic diarrhea lasting longer than 3 weeks? no   2. Do you have a large amount of rectal bleeding or  frequent rectal bleeding? no  3. Do you have significant, unexplained weight loss? no    Open Access APPROVED yes    Patient is scheduled with Dr. Moseley for 4.29.2025. Miralax Prep and packet mailed 4.10.2025

## 2025-04-11 ENCOUNTER — TELEPHONE (OUTPATIENT)
Dept: PRIMARY CARE | Facility: CLINIC | Age: 65
End: 2025-04-11
Payer: COMMERCIAL

## 2025-04-11 NOTE — RESULT ENCOUNTER NOTE
Ultrasound to abdomen showed no abnormalities. We will continue with the treatment we talked about at appointment, and colonoscopy.

## 2025-04-11 NOTE — TELEPHONE ENCOUNTER
----- Message from Madyson Hand sent at 4/11/2025 11:43 AM EDT -----  Ultrasound to abdomen showed no abnormalities. We will continue with the treatment we talked about at appointment, and colonoscopy.

## 2025-04-21 DIAGNOSIS — R10.13 EPIGASTRIC PAIN: Primary | ICD-10-CM

## 2025-04-29 ENCOUNTER — ANESTHESIA (OUTPATIENT)
Dept: GASTROENTEROLOGY | Facility: HOSPITAL | Age: 65
End: 2025-04-29
Payer: COMMERCIAL

## 2025-04-29 ENCOUNTER — ANESTHESIA EVENT (OUTPATIENT)
Dept: GASTROENTEROLOGY | Facility: HOSPITAL | Age: 65
End: 2025-04-29
Payer: COMMERCIAL

## 2025-04-29 ENCOUNTER — HOSPITAL ENCOUNTER (OUTPATIENT)
Dept: GASTROENTEROLOGY | Facility: HOSPITAL | Age: 65
Discharge: HOME | End: 2025-04-29
Payer: COMMERCIAL

## 2025-04-29 VITALS
TEMPERATURE: 97.6 F | OXYGEN SATURATION: 99 % | SYSTOLIC BLOOD PRESSURE: 111 MMHG | DIASTOLIC BLOOD PRESSURE: 82 MMHG | HEART RATE: 74 BPM | RESPIRATION RATE: 16 BRPM

## 2025-04-29 DIAGNOSIS — D12.6 ADENOMATOUS POLYP OF COLON, UNSPECIFIED PART OF COLON: Primary | ICD-10-CM

## 2025-04-29 DIAGNOSIS — Z12.11 COLON CANCER SCREENING: ICD-10-CM

## 2025-04-29 PROCEDURE — 3700000001 HC GENERAL ANESTHESIA TIME - INITIAL BASE CHARGE

## 2025-04-29 PROCEDURE — 7100000009 HC PHASE TWO TIME - INITIAL BASE CHARGE

## 2025-04-29 PROCEDURE — 2500000004 HC RX 250 GENERAL PHARMACY W/ HCPCS (ALT 636 FOR OP/ED): Mod: JZ | Performed by: INTERNAL MEDICINE

## 2025-04-29 PROCEDURE — 2720000007 HC OR 272 NO HCPCS

## 2025-04-29 PROCEDURE — 7100000010 HC PHASE TWO TIME - EACH INCREMENTAL 1 MINUTE

## 2025-04-29 PROCEDURE — 2500000004 HC RX 250 GENERAL PHARMACY W/ HCPCS (ALT 636 FOR OP/ED): Mod: JZ | Performed by: NURSE ANESTHETIST, CERTIFIED REGISTERED

## 2025-04-29 PROCEDURE — 45385 COLONOSCOPY W/LESION REMOVAL: CPT | Performed by: INTERNAL MEDICINE

## 2025-04-29 PROCEDURE — 3700000002 HC GENERAL ANESTHESIA TIME - EACH INCREMENTAL 1 MINUTE

## 2025-04-29 RX ORDER — SODIUM CHLORIDE 9 MG/ML
20 INJECTION, SOLUTION INTRAVENOUS CONTINUOUS
Status: ACTIVE | OUTPATIENT
Start: 2025-04-29 | End: 2025-04-29

## 2025-04-29 RX ORDER — PROPOFOL 10 MG/ML
INJECTION, EMULSION INTRAVENOUS AS NEEDED
Status: DISCONTINUED | OUTPATIENT
Start: 2025-04-29 | End: 2025-04-29

## 2025-04-29 RX ADMIN — PROPOFOL 200 MG: 10 INJECTION, EMULSION INTRAVENOUS at 07:32

## 2025-04-29 RX ADMIN — SODIUM CHLORIDE 20 ML/HR: 0.9 INJECTION, SOLUTION INTRAVENOUS at 07:05

## 2025-04-29 SDOH — HEALTH STABILITY: MENTAL HEALTH: CURRENT SMOKER: 1

## 2025-04-29 ASSESSMENT — COLUMBIA-SUICIDE SEVERITY RATING SCALE - C-SSRS
1. IN THE PAST MONTH, HAVE YOU WISHED YOU WERE DEAD OR WISHED YOU COULD GO TO SLEEP AND NOT WAKE UP?: NO
6. HAVE YOU EVER DONE ANYTHING, STARTED TO DO ANYTHING, OR PREPARED TO DO ANYTHING TO END YOUR LIFE?: NO
2. HAVE YOU ACTUALLY HAD ANY THOUGHTS OF KILLING YOURSELF?: NO

## 2025-04-29 ASSESSMENT — PAIN SCALES - GENERAL
PAINLEVEL_OUTOF10: 0 - NO PAIN
PAINLEVEL_OUTOF10: 6
PAINLEVEL_OUTOF10: 0 - NO PAIN
PAINLEVEL_OUTOF10: 3

## 2025-04-29 ASSESSMENT — PAIN - FUNCTIONAL ASSESSMENT
PAIN_FUNCTIONAL_ASSESSMENT: 0-10
PAIN_FUNCTIONAL_ASSESSMENT: 0-10

## 2025-04-29 ASSESSMENT — PAIN DESCRIPTION - DESCRIPTORS: DESCRIPTORS: SHOOTING

## 2025-04-29 NOTE — H&P
History Of Present Illness  Siva Priest is a 64 y.o. male presenting for screening colonoscopy.     Past Medical History  Medical History[1]    Surgical History  Surgical History[2]     Social History  He reports that he has been smoking cigarettes. He started smoking about 49 years ago. He has a 24.7 pack-year smoking history. He has never used smokeless tobacco. He reports current alcohol use of about 40.0 standard drinks of alcohol per week. He reports current drug use. Drug: Marijuana.    Family History  Family History[3]     Allergies  Sulfamethoxazole-trimethoprim    Review of Systems     Physical Exam  Constitutional:       General: He is awake.      Appearance: Normal appearance.   HENT:      Head: Normocephalic and atraumatic.      Nose: Nose normal.      Mouth/Throat:      Mouth: Mucous membranes are moist.   Eyes:      Pupils: Pupils are equal, round, and reactive to light.   Neck:      Thyroid: No thyroid mass.      Trachea: Phonation normal.   Cardiovascular:      Rate and Rhythm: Normal rate and regular rhythm.   Pulmonary:      Effort: Pulmonary effort is normal. No respiratory distress.      Breath sounds: Normal air entry. No decreased breath sounds, wheezing, rhonchi or rales.   Abdominal:      General: Bowel sounds are normal. There is no distension.      Palpations: Abdomen is soft.      Tenderness: There is no abdominal tenderness.   Musculoskeletal:      Cervical back: Neck supple.      Right lower leg: No edema.      Left lower leg: No edema.   Skin:     General: Skin is warm.      Capillary Refill: Capillary refill takes less than 2 seconds.   Neurological:      General: No focal deficit present.      Mental Status: He is alert and oriented to person, place, and time. Mental status is at baseline.      Cranial Nerves: Cranial nerves 2-12 are intact.      Motor: Motor function is intact.   Psychiatric:         Attention and Perception: Attention and perception normal.         Mood and  Affect: Mood normal.         Speech: Speech normal.         Behavior: Behavior normal.          Last Recorded Vitals  Blood pressure (!) 129/96, pulse 95, temperature 36.3 °C (97.3 °F), temperature source Temporal, resp. rate 16, SpO2 98%.    Relevant Results      Current Outpatient Medications   Medication Instructions    clotrimazole-betamethasone (Lotrisone) cream APPLY  AND RUB  IN A THIN FILM TO AFFECTED AREAS TWICE DAILY.(AM AND PM).    nystatin (Mycostatin) cream APPLY 2-3 TIMES DAILY TO AFFECTED AREA(S).    omeprazole (PRILOSEC) 20 mg, oral, Daily, Do not crush or chew.          Assessment & Plan  Colon cancer screening    Colonoscopy for evaluation    Risk and benefits of the endoscopic procedure including bleeding perforation and infection were discussed with patient and they wish to proceed          Lane Moseley DO         [1]   Past Medical History:  Diagnosis Date    Cubital tunnel syndrome on left 03/01/2023    Herniated nucleus pulposus of lumbosacral region 03/01/2023    Lumbar disc herniation 03/01/2023    Personal history of other diseases of urinary system     History of blood in urine   [2]   Past Surgical History:  Procedure Laterality Date    OTHER SURGICAL HISTORY  01/31/2022    Lower back surgery    OTHER SURGICAL HISTORY  01/31/2022    Biopsy   [3]   Family History  Problem Relation Name Age of Onset    Pancreatic cancer Mother      Parkinsonism Father

## 2025-04-29 NOTE — ANESTHESIA POSTPROCEDURE EVALUATION
Patient: Siva Priest    Procedure Summary       Date: 04/29/25 Room / Location: [facility]    Anesthesia Start: 0731 Anesthesia Stop: 0801    Procedure: COLONOSCOPY Diagnosis: Colon cancer screening    Scheduled Providers: Lane Moseley DO Responsible Provider: NESS Holm    Anesthesia Type: MAC ASA Status: 3            Anesthesia Type: MAC    Vitals Value Taken Time   /82 04/29/25 08:18   Temp 36.4 °C (97.6 °F) 04/29/25 08:18   Pulse 74 04/29/25 08:18   Resp 16 04/29/25 08:18   SpO2 99 % 04/29/25 08:18       Anesthesia Post Evaluation    Patient location during evaluation: bedside  Patient participation: complete - patient participated  Level of consciousness: awake and alert  Pain management: adequate  Airway patency: patent  Cardiovascular status: acceptable  Respiratory status: acceptable  Hydration status: acceptable  Postoperative Nausea and Vomiting: none        No notable events documented.

## 2025-04-29 NOTE — ANESTHESIA PREPROCEDURE EVALUATION
Patient: Siva Priest    Procedure Information       Date/Time: 04/29/25 0730    Scheduled providers: Laen Moseley DO    Procedure: COLONOSCOPY    Location: [facility]            Relevant Problems   Musculoskeletal   (+) DDD (degenerative disc disease), lumbar   (+) Spinal stenosis of lumbar region with neurogenic claudication       Clinical information reviewed:   Tobacco  Allergies  Meds   Med Hx  Surg Hx   Fam Hx  Soc Hx        NPO Detail:  NPO/Void Status  Carbohydrate Drink Given Prior to Surgery? : Y  Date of Last Liquid: 04/29/25  Time of Last Liquid: 0100  Date of Last Solid: 04/27/25  Time of Last Solid: 2200  Last Intake Type: Clear fluids  Time of Last Void: 0545         Physical Exam    Airway  Mallampati: II     Cardiovascular   Rhythm: regular  Rate: normal     Dental     (+) upper dentures, lower dentures     Pulmonary    Abdominal            Anesthesia Plan    History of general anesthesia?: yes  History of complications of general anesthesia?: no    ASA 3     MAC     The patient is a current smoker.  Patient smoked on day of procedure.  Education provided regarding risk of obstructive sleep apnea.  intravenous induction   Anesthetic plan and risks discussed with patient.

## 2025-05-02 ENCOUNTER — TELEPHONE (OUTPATIENT)
Dept: PRIMARY CARE | Facility: CLINIC | Age: 65
End: 2025-05-02
Payer: COMMERCIAL

## 2025-05-02 NOTE — RESULT ENCOUNTER NOTE
Recommend high-fiber diet 30 g daily.  Recommend repeat colonoscopy in the next 3 to 6 months for removal of the  large polyps in the cecum with advanced gastroenterology.  Orders placed  for repeat colonoscopy  This procedure is done at [facility]. To schedule please call [phone]  or the general scheduling line.

## 2025-05-02 NOTE — TELEPHONE ENCOUNTER
----- Message from Madyson Hand sent at 5/2/2025 12:20 PM EDT -----  Recommend high-fiber diet 30 g daily.  Recommend repeat colonoscopy in the next 3 to 6 months for removal of the  large polyps in the cecum with advanced gastroenterology.  Orders placed  for repeat colonoscopy  This procedure is done at [facility]. To schedule please call [phone]  or the general scheduling line.  ----- Message -----  From: Lane Moseley DO  Sent: 4/29/2025   7:59 AM EDT  To: Madyson Hand, TREY-CNP

## 2025-05-06 LAB
LABORATORY COMMENT REPORT: NORMAL
PATH REPORT.FINAL DX SPEC: NORMAL
PATH REPORT.GROSS SPEC: NORMAL
PATH REPORT.RELEVANT HX SPEC: NORMAL
PATH REPORT.TOTAL CANCER: NORMAL

## 2025-05-09 ENCOUNTER — APPOINTMENT (OUTPATIENT)
Dept: PRIMARY CARE | Facility: CLINIC | Age: 65
End: 2025-05-09
Payer: COMMERCIAL

## 2025-05-09 VITALS
SYSTOLIC BLOOD PRESSURE: 126 MMHG | WEIGHT: 137 LBS | HEART RATE: 91 BPM | HEIGHT: 70 IN | OXYGEN SATURATION: 96 % | BODY MASS INDEX: 19.61 KG/M2 | DIASTOLIC BLOOD PRESSURE: 87 MMHG

## 2025-05-09 DIAGNOSIS — R10.84 GENERALIZED ABDOMINAL PAIN: ICD-10-CM

## 2025-05-09 DIAGNOSIS — Z00.00 HEALTH CARE MAINTENANCE: ICD-10-CM

## 2025-05-09 DIAGNOSIS — J32.9 SINUSITIS, UNSPECIFIED CHRONICITY, UNSPECIFIED LOCATION: Primary | ICD-10-CM

## 2025-05-09 PROCEDURE — 3008F BODY MASS INDEX DOCD: CPT

## 2025-05-09 PROCEDURE — 99213 OFFICE O/P EST LOW 20 MIN: CPT

## 2025-05-09 PROCEDURE — 4004F PT TOBACCO SCREEN RCVD TLK: CPT

## 2025-05-09 RX ORDER — DICYCLOMINE HYDROCHLORIDE 10 MG/1
10 CAPSULE ORAL 4 TIMES DAILY PRN
Qty: 30 CAPSULE | Refills: 0 | Status: SHIPPED | OUTPATIENT
Start: 2025-05-09 | End: 2025-07-08

## 2025-05-09 RX ORDER — AZITHROMYCIN 250 MG/1
TABLET, FILM COATED ORAL
Qty: 6 TABLET | Refills: 0 | Status: SHIPPED | OUTPATIENT
Start: 2025-05-09 | End: 2025-05-14

## 2025-05-09 RX ORDER — FLUTICASONE PROPIONATE 50 MCG
1 SPRAY, SUSPENSION (ML) NASAL DAILY
Qty: 16 G | Refills: 5 | Status: SHIPPED | OUTPATIENT
Start: 2025-05-09 | End: 2026-05-09

## 2025-05-09 RX ORDER — OMEPRAZOLE 40 MG/1
40 CAPSULE, DELAYED RELEASE ORAL
Qty: 30 CAPSULE | Refills: 11 | Status: SHIPPED | OUTPATIENT
Start: 2025-05-09 | End: 2026-05-09

## 2025-05-09 ASSESSMENT — ENCOUNTER SYMPTOMS
ABDOMINAL DISTENTION: 1
DEPRESSION: 0
OCCASIONAL FEELINGS OF UNSTEADINESS: 0
RHINORRHEA: 1
SINUS PAIN: 1
ABDOMINAL PAIN: 1
LOSS OF SENSATION IN FEET: 0

## 2025-05-09 ASSESSMENT — PATIENT HEALTH QUESTIONNAIRE - PHQ9
1. LITTLE INTEREST OR PLEASURE IN DOING THINGS: NOT AT ALL
SUM OF ALL RESPONSES TO PHQ9 QUESTIONS 1 AND 2: 0
2. FEELING DOWN, DEPRESSED OR HOPELESS: NOT AT ALL

## 2025-05-09 NOTE — PROGRESS NOTES
"Subjective   Patient ID: Siva Priest is a 64 y.o. male who presents for Follow-up (Patient here for a 1 mo follow-up/Discuss still nnot feeling good and sinus infection ).    Past Medical, Surgical, and Family History reviewed and updated in chart.     Reviewed all medications by prescribing practitioner or clinical pharmacist (such as prescriptions, OTCs, herbal therapies and supplements) and documented in the medical record.    HPI   64 YOM in office for follow-up.  Discussed lab work in office, recommendations made.    Was started on omeprazole at last visit and told to avoid certain foods only eat a bland diet states had about 4-5 days was doing good but now continues to have that mid upper abdominal pain.  Endorses does not seem to related to food eating, activity.  Endorses has changed to a bland diet but while speaking with patient talks about drinking pop and beer.  Does have appointment with GI in June with recommendations of EGD.  Endorses has used Gas-X it is not effective.  Denies blood in stool, or mucus.  Has no appettie when it is really painful. If burps it does start to feel better, does state has a lot of gas while having bowel movements.  Recently had a colonoscopy that showed small scattered diverticula, removal of polyp and has to follow-up at [facility] for removal of a sessile polyp.    Patient also endorses since last week has had sinus pressure, pain, postnasal drip, drainage dark yellowish in color denies fever.  Endorses dry cough.    Review of Systems   HENT:  Positive for congestion, postnasal drip, rhinorrhea and sinus pain.    Gastrointestinal:  Positive for abdominal distention and abdominal pain.       Objective   /87 (BP Location: Left arm, Patient Position: Sitting, BP Cuff Size: Adult)   Pulse 91   Ht 1.778 m (5' 10\")   Wt 62.1 kg (137 lb)   SpO2 96%   BMI 19.66 kg/m²     Physical Exam  Constitutional:       Appearance: Normal appearance.   HENT:      Nose: " Congestion and rhinorrhea present.   Neurological:      Mental Status: He is alert and oriented to person, place, and time.         Assessment/Plan   Problem List Items Addressed This Visit       Generalized abdominal pain    Will increase omeprazole to 40 mg daily.  Will add dicyclomine for stomach cramps.  Has follow-up with GI 6/19/25.  Continue to have a bland diet avoiding spicy food, fatty food, raw vegetables, caffeine, alcohol, fried food.         Relevant Medications    dicyclomine (Bentyl) 10 mg capsule    omeprazole (PriLOSEC) 40 mg DR capsule    Health care maintenance     Other Visit Diagnoses         Sinusitis, unspecified chronicity, unspecified location    -  Primary    Relevant Medications    azithromycin (Zithromax) 250 mg tablet    fluticasone (Flonase) 50 mcg/actuation nasal spray

## 2025-05-09 NOTE — PATIENT INSTRUCTIONS
[place] diet discussed, avoid spicy, fatty foods, raw vegetables, caffeine, alcohol, fired foods.

## 2025-05-09 NOTE — ASSESSMENT & PLAN NOTE
Will increase omeprazole to 40 mg daily.  Will add dicyclomine for stomach cramps.  Has follow-up with GI 6/19/25.  Continue to have a bland diet avoiding spicy food, fatty food, raw vegetables, caffeine, alcohol, fried food.

## 2025-06-16 ENCOUNTER — APPOINTMENT (OUTPATIENT)
Facility: CLINIC | Age: 65
End: 2025-06-16
Payer: COMMERCIAL

## 2025-06-16 VITALS
DIASTOLIC BLOOD PRESSURE: 82 MMHG | HEART RATE: 86 BPM | BODY MASS INDEX: 19.13 KG/M2 | SYSTOLIC BLOOD PRESSURE: 116 MMHG | HEIGHT: 70 IN | OXYGEN SATURATION: 97 % | WEIGHT: 133.6 LBS

## 2025-06-16 DIAGNOSIS — K90.9 STEATORRHEA (HHS-HCC): Primary | ICD-10-CM

## 2025-06-16 DIAGNOSIS — D12.6 ADENOMATOUS POLYP OF COLON, UNSPECIFIED PART OF COLON: ICD-10-CM

## 2025-06-16 DIAGNOSIS — R10.13 DYSPEPSIA: ICD-10-CM

## 2025-06-16 DIAGNOSIS — R10.13 EPIGASTRIC PAIN: ICD-10-CM

## 2025-06-16 PROCEDURE — 99214 OFFICE O/P EST MOD 30 MIN: CPT | Performed by: PHYSICIAN ASSISTANT

## 2025-06-16 PROCEDURE — 4004F PT TOBACCO SCREEN RCVD TLK: CPT | Performed by: PHYSICIAN ASSISTANT

## 2025-06-16 PROCEDURE — 3008F BODY MASS INDEX DOCD: CPT | Performed by: PHYSICIAN ASSISTANT

## 2025-06-16 NOTE — H&P (VIEW-ONLY)
Subjective   Patient ID: Siva Priest is a 64 y.o. male who was referred by his PCP for Dyspepsia and Bloated (Abdominal ultrasound 4/18/25/Colon 4/29/25).    Patient's PCP is Madyson GRANT    PMH: alcohol use    HPI  Patient states that he has been dealing with bloating and upper abdominal pain for the past 4 months. He reports epigastric pain, which is pressure-like in nature and mostly constant. He states that the pain and bloating can worsen at times, but it usually always present. Starting the omeprazole 40mg daily has helped some. He has also increased his dietary fiber. He states that symptoms can occur even without eating, but eating does sometimes worsen the discomfort. He states that he does have some chronic loose stool. He states that stool does appear oily and floats on top of the water. He denies unexplained weight loss, dysphagia, N/V, melena, hematochezia.    Social History:  NSAIDs: Every other day  Tobacco: +less than 1/2 PPD  Alcohol: + 6 beers a day  Drug use: +THC daily    Family History: Denies     Prior GI evaluation:  Colonoscopy 4/29/2025: 1 35mm polyp in the cecum (not removed), 12mm polyp removed from transverse colon (TA). Diverticulosis and hemorrhoids seen. Diagnostic colonoscopy with polypectomy was recommended at [facility], but never done.    US 4/8/2025: unremarkable    Review of Systems:  Constitutional: No reported fever, chills, or weight loss.  Skin: No reported icterus, lesions, or rash.  Eye: No reported itching, pain, vision changes.  Ear: No reported discharge, hearing loss, or pain.  Nose: No reported congestion, discharge, or epistaxis.  Mouth/throat: No reported dysphagia, hoarseness, or throat pain.  Resp: No reported cough, dyspnea, or wheezing.  Cardiovascular: No reported chest pain, lower extremity edema, or palpitations.   GI: +epigastric pain, steatorrhea, dyspepsia  : No reported dysuria, hematuria, or frequency.  Neuro: No reported confusion,  memory loss, headaches, or dizziness.  Psych: No reported anxiety, depression, or insomnia.  Musculoskeletal: No reported arthralgia, joint swelling, or myalgias.  Heme/lymph: No reported easy bleeding or bruising, or swollen lymph nodes.  Endocrine: No reported cold/heat intolerance, polydipsia, or polyuria.     Medications:  Prior to Admission medications    Medication Sig Start Date End Date Taking? Authorizing Provider   clotrimazole-betamethasone (Lotrisone) cream APPLY  AND RUB  IN A THIN FILM TO AFFECTED AREAS TWICE DAILY.(AM AND PM). 4/22/21   Historical Provider, MD   dicyclomine (Bentyl) 10 mg capsule Take 1 capsule (10 mg) by mouth 4 times a day as needed (abdominal pain or cramps). 5/9/25 7/8/25  RUDY Mcfadden   fluticasone (Flonase) 50 mcg/actuation nasal spray Administer 1 spray into each nostril once daily. Shake gently. Before first use, prime pump. After use, clean tip and replace cap. 5/9/25 5/9/26  RUDY Mcfadden   omeprazole (PriLOSEC) 40 mg DR capsule Take 1 capsule (40 mg) by mouth once daily in the morning. Take before meals. Do not crush or chew. 5/9/25 5/9/26  RUDY Mcfadden       Allergies:  Sulfamethoxazole-trimethoprim    Objective   Physical exam:  Constitutional: Well developed, well nourished 64 y.o. year old in no acute distress.   Skin: Warm and dry. Normal turgor. No rash, ulcer, trauma, jaundice.   Eyes: Pupils symmetric and reactive to light.  Respiratory: Clear to auscultation bilaterally. No wheezes, rhonchi, or rales heard.  Cardiovascular: Regular rate and rhythm. S1 and S2 appreciated and normal. No murmur, rub, or gallop heard.   Edema: No edema noted.  GI: Normal bowel sounds. Soft, non-distended, TTP in epigastric region. No rebound or guarding present. No hepatomegaly or splenomegaly appreciated. Abdominal aorta not palpably abnormal.  Musculoskeletal: Limbs and Joints grossly normal. Full range of motion in major joint.   Neuro:  "Alert and oriented x 3. Cranial nerves 2-12 grossly intact.   Psych: Normal mood and affect.        Relevant Results Recent labs reviewed in the EMR.    Radiology: Reviewed imaging reviewed in the EMR.  Imaging  No results found.    Cardiology, Vascular, and Other Imaging  No other imaging results found for the past 7 days      Assessment/Plan   Problem List Items Addressed This Visit           ICD-10-CM    Dyspepsia R10.13    Patient with bloating and epigastric pain. He has had some improvement with daily PPI. Atypical acid reflux is possible. EGD ordered to rule out H. Pylori or PUD. RUQ US normal.          Relevant Orders    Esophagogastroduodenoscopy (EGD)    Pancreatic Elastase, Fecal    Steatorrhea ([facility]) - Primary K90.9    Patient reports stool that is \"oily\" and floats on the water. With his alcohol abuse, EPI is posisble. Pancreatic elastase ordered.          Relevant Orders    Pancreatic Elastase, Fecal    Adenomatous polyp of colon D12.6    Discussed with patient that large polyp was seen during colonoscopy in April. Due to it's size, it was unable to be safely removed at [facility]. Discussed that this is likely precancerous at the least, but could be more advanced. Discussed that removal is necessary. New order placed for colonoscopy at [facility]         Relevant Orders    Colonoscopy Diagnostic     Other Visit Diagnoses         Codes      Epigastric pain     R10.13            Meds to hold: none  Hue Shanks PA-C    "

## 2025-06-16 NOTE — PROGRESS NOTES
Subjective   Patient ID: Siva Priest is a 64 y.o. male who was referred by his PCP for Dyspepsia and Bloated (Abdominal ultrasound 4/18/25/Colon 4/29/25).    Patient's PCP is Madyson GRANT    PMH: alcohol use    HPI  Patient states that he has been dealing with bloating and upper abdominal pain for the past 4 months. He reports epigastric pain, which is pressure-like in nature and mostly constant. He states that the pain and bloating can worsen at times, but it usually always present. Starting the omeprazole 40mg daily has helped some. He has also increased his dietary fiber. He states that symptoms can occur even without eating, but eating does sometimes worsen the discomfort. He states that he does have some chronic loose stool. He states that stool does appear oily and floats on top of the water. He denies unexplained weight loss, dysphagia, N/V, melena, hematochezia.    Social History:  NSAIDs: Every other day  Tobacco: +less than 1/2 PPD  Alcohol: + 6 beers a day  Drug use: +THC daily    Family History: Denies     Prior GI evaluation:  Colonoscopy 4/29/2025: 1 35mm polyp in the cecum (not removed), 12mm polyp removed from transverse colon (TA). Diverticulosis and hemorrhoids seen. Diagnostic colonoscopy with polypectomy was recommended at [facility], but never done.    US 4/8/2025: unremarkable    Review of Systems:  Constitutional: No reported fever, chills, or weight loss.  Skin: No reported icterus, lesions, or rash.  Eye: No reported itching, pain, vision changes.  Ear: No reported discharge, hearing loss, or pain.  Nose: No reported congestion, discharge, or epistaxis.  Mouth/throat: No reported dysphagia, hoarseness, or throat pain.  Resp: No reported cough, dyspnea, or wheezing.  Cardiovascular: No reported chest pain, lower extremity edema, or palpitations.   GI: +epigastric pain, steatorrhea, dyspepsia  : No reported dysuria, hematuria, or frequency.  Neuro: No reported confusion,  memory loss, headaches, or dizziness.  Psych: No reported anxiety, depression, or insomnia.  Musculoskeletal: No reported arthralgia, joint swelling, or myalgias.  Heme/lymph: No reported easy bleeding or bruising, or swollen lymph nodes.  Endocrine: No reported cold/heat intolerance, polydipsia, or polyuria.     Medications:  Prior to Admission medications    Medication Sig Start Date End Date Taking? Authorizing Provider   clotrimazole-betamethasone (Lotrisone) cream APPLY  AND RUB  IN A THIN FILM TO AFFECTED AREAS TWICE DAILY.(AM AND PM). 4/22/21   Historical Provider, MD   dicyclomine (Bentyl) 10 mg capsule Take 1 capsule (10 mg) by mouth 4 times a day as needed (abdominal pain or cramps). 5/9/25 7/8/25  RUDY Mcfadden   fluticasone (Flonase) 50 mcg/actuation nasal spray Administer 1 spray into each nostril once daily. Shake gently. Before first use, prime pump. After use, clean tip and replace cap. 5/9/25 5/9/26  RUDY Mcfadden   omeprazole (PriLOSEC) 40 mg DR capsule Take 1 capsule (40 mg) by mouth once daily in the morning. Take before meals. Do not crush or chew. 5/9/25 5/9/26  RUDY Mcfadden       Allergies:  Sulfamethoxazole-trimethoprim    Objective   Physical exam:  Constitutional: Well developed, well nourished 64 y.o. year old in no acute distress.   Skin: Warm and dry. Normal turgor. No rash, ulcer, trauma, jaundice.   Eyes: Pupils symmetric and reactive to light.  Respiratory: Clear to auscultation bilaterally. No wheezes, rhonchi, or rales heard.  Cardiovascular: Regular rate and rhythm. S1 and S2 appreciated and normal. No murmur, rub, or gallop heard.   Edema: No edema noted.  GI: Normal bowel sounds. Soft, non-distended, TTP in epigastric region. No rebound or guarding present. No hepatomegaly or splenomegaly appreciated. Abdominal aorta not palpably abnormal.  Musculoskeletal: Limbs and Joints grossly normal. Full range of motion in major joint.   Neuro:  "Alert and oriented x 3. Cranial nerves 2-12 grossly intact.   Psych: Normal mood and affect.        Relevant Results Recent labs reviewed in the EMR.    Radiology: Reviewed imaging reviewed in the EMR.  Imaging  No results found.    Cardiology, Vascular, and Other Imaging  No other imaging results found for the past 7 days      Assessment/Plan   Problem List Items Addressed This Visit           ICD-10-CM    Dyspepsia R10.13    Patient with bloating and epigastric pain. He has had some improvement with daily PPI. Atypical acid reflux is possible. EGD ordered to rule out H. Pylori or PUD. RUQ US normal.          Relevant Orders    Esophagogastroduodenoscopy (EGD)    Pancreatic Elastase, Fecal    Steatorrhea ([facility]) - Primary K90.9    Patient reports stool that is \"oily\" and floats on the water. With his alcohol abuse, EPI is posisble. Pancreatic elastase ordered.          Relevant Orders    Pancreatic Elastase, Fecal    Adenomatous polyp of colon D12.6    Discussed with patient that large polyp was seen during colonoscopy in April. Due to it's size, it was unable to be safely removed at [facility]. Discussed that this is likely precancerous at the least, but could be more advanced. Discussed that removal is necessary. New order placed for colonoscopy at [facility].         Relevant Orders    Colonoscopy Diagnostic     Other Visit Diagnoses         Codes      Epigastric pain     R10.13            Meds to hold: none  Hue Shanks PA-C    "

## 2025-06-16 NOTE — LETTER
June 16, 2025     RUDY Mcfadden  [address]  [place] [address]    Patient: Siva Priest   YOB: 1960   Date of Visit: 6/16/2025       Dear RUDY Pryor:    Thank you for referring Siva Priest to me for evaluation. Below are my notes for this consultation.  If you have questions, please do not hesitate to call me. I look forward to following your patient along with you.       Sincerely,     Hue Shanks PA-C      CC: No Recipients  ______________________________________________________________________________________    Subjective  Patient ID: Siva Priest is a 64 y.o. male who was referred by his PCP for Dyspepsia and Bloated (Abdominal ultrasound 4/18/25/Colon 4/29/25).    Patient's PCP is Madyson GRANT    PMH: alcohol use    HPI  Patient states that he has been dealing with bloating and upper abdominal pain for the past 4 months. He reports epigastric pain, which is pressure-like in nature and mostly constant. He states that the pain and bloating can worsen at times, but it usually always present. Starting the omeprazole 40mg daily has helped some. He has also increased his dietary fiber. He states that symptoms can occur even without eating, but eating does sometimes worsen the discomfort. He states that he does have some chronic loose stool. He states that stool does appear oily and floats on top of the water. He denies unexplained weight loss, dysphagia, N/V, melena, hematochezia.    Social History:  NSAIDs: Every other day  Tobacco: +less than 1/2 PPD  Alcohol: + 6 beers a day  Drug use: +THC daily    Family History: Denies     Prior GI evaluation:  Colonoscopy 4/29/2025: 1 35mm polyp in the cecum (not removed), 12mm polyp removed from transverse colon (TA). Diverticulosis and hemorrhoids seen. Diagnostic colonoscopy with polypectomy was recommended at [facility], but never  done.    US 4/8/2025: unremarkable    Review of Systems:  Constitutional: No reported fever, chills, or weight loss.  Skin: No reported icterus, lesions, or rash.  Eye: No reported itching, pain, vision changes.  Ear: No reported discharge, hearing loss, or pain.  Nose: No reported congestion, discharge, or epistaxis.  Mouth/throat: No reported dysphagia, hoarseness, or throat pain.  Resp: No reported cough, dyspnea, or wheezing.  Cardiovascular: No reported chest pain, lower extremity edema, or palpitations.   GI: +epigastric pain, steatorrhea, dyspepsia  : No reported dysuria, hematuria, or frequency.  Neuro: No reported confusion, memory loss, headaches, or dizziness.  Psych: No reported anxiety, depression, or insomnia.  Musculoskeletal: No reported arthralgia, joint swelling, or myalgias.  Heme/lymph: No reported easy bleeding or bruising, or swollen lymph nodes.  Endocrine: No reported cold/heat intolerance, polydipsia, or polyuria.     Medications:  Prior to Admission medications    Medication Sig Start Date End Date Taking? Authorizing Provider   clotrimazole-betamethasone (Lotrisone) cream APPLY  AND RUB  IN A THIN FILM TO AFFECTED AREAS TWICE DAILY.(AM AND PM). 4/22/21   Historical Provider, MD   dicyclomine (Bentyl) 10 mg capsule Take 1 capsule (10 mg) by mouth 4 times a day as needed (abdominal pain or cramps). 5/9/25 7/8/25  RUDY Mcfadden   fluticasone (Flonase) 50 mcg/actuation nasal spray Administer 1 spray into each nostril once daily. Shake gently. Before first use, prime pump. After use, clean tip and replace cap. 5/9/25 5/9/26  RUDY Mcfadden   omeprazole (PriLOSEC) 40 mg DR capsule Take 1 capsule (40 mg) by mouth once daily in the morning. Take before meals. Do not crush or chew. 5/9/25 5/9/26  RUDY Mcfadden       Allergies:  Sulfamethoxazole-trimethoprim    Objective  Physical exam:  Constitutional: Well developed, well nourished 64 y.o. year old in  "no acute distress.   Skin: Warm and dry. Normal turgor. No rash, ulcer, trauma, jaundice.   Eyes: Pupils symmetric and reactive to light.  Respiratory: Clear to auscultation bilaterally. No wheezes, rhonchi, or rales heard.  Cardiovascular: Regular rate and rhythm. S1 and S2 appreciated and normal. No murmur, rub, or gallop heard.   Edema: No edema noted.  GI: Normal bowel sounds. Soft, non-distended, TTP in epigastric region. No rebound or guarding present. No hepatomegaly or splenomegaly appreciated. Abdominal aorta not palpably abnormal.  Musculoskeletal: Limbs and Joints grossly normal. Full range of motion in major joint.   Neuro: Alert and oriented x 3. Cranial nerves 2-12 grossly intact.   Psych: Normal mood and affect.        Relevant Results Recent labs reviewed in the EMR.    Radiology: Reviewed imaging reviewed in the EMR.  Imaging  No results found.    Cardiology, Vascular, and Other Imaging  No other imaging results found for the past 7 days      Assessment/Plan  Problem List Items Addressed This Visit           ICD-10-CM    Dyspepsia R10.13    Patient with bloating and epigastric pain. He has had some improvement with daily PPI. Atypical acid reflux is possible. EGD ordered to rule out H. Pylori or PUD. RUQ US normal.          Relevant Orders    Esophagogastroduodenoscopy (EGD)    Pancreatic Elastase, Fecal    Steatorrhea ([facility]) - Primary K90.9    Patient reports stool that is \"oily\" and floats on the water. With his alcohol abuse, EPI is posisble. Pancreatic elastase ordered.          Relevant Orders    Pancreatic Elastase, Fecal    Adenomatous polyp of colon D12.6    Discussed with patient that large polyp was seen during colonoscopy in April. Due to it's size, it was unable to be safely removed at [facility]. Discussed that this is likely precancerous at the least, but could be more advanced. Discussed that removal is necessary. New order placed for colonoscopy at [facility].         Relevant Orders "    Colonoscopy Diagnostic     Other Visit Diagnoses         Codes      Epigastric pain     R10.13            Meds to hold: none  Hue Shanks PA-C

## 2025-06-16 NOTE — ASSESSMENT & PLAN NOTE
Discussed with patient that large polyp was seen during colonoscopy in April. Due to it's size, it was unable to be safely removed at [facility]. Discussed that this is likely precancerous at the least, but could be more advanced. Discussed that removal is necessary. New order placed for colonoscopy at [facility].

## 2025-06-16 NOTE — ASSESSMENT & PLAN NOTE
Patient with bloating and epigastric pain. He has had some improvement with daily PPI. Atypical acid reflux is possible. EGD ordered to rule out H. Pylori or PUD. RUQ US normal.

## 2025-06-16 NOTE — PATIENT INSTRUCTIONS
Thank you for coming in for your appointment.    -Get EGD done here  -Continue omeprazole daily  -Get colonoscopy done at [facility] to remove the large colon polyps seen on recent colonoscopy. This is likely precancerous at the least and needs to be removed for further evaluation.  -Get stool study done for pancreatic enzyme levels    Call with questions or concerns.

## 2025-06-16 NOTE — ASSESSMENT & PLAN NOTE
"Patient reports stool that is \"oily\" and floats on the water. With his alcohol abuse, EPI is posisble. Pancreatic elastase ordered.   "

## 2025-06-23 ENCOUNTER — PREP FOR PROCEDURE (OUTPATIENT)
Facility: CLINIC | Age: 65
End: 2025-06-23
Payer: COMMERCIAL

## 2025-06-23 ENCOUNTER — ANESTHESIA EVENT (OUTPATIENT)
Dept: GASTROENTEROLOGY | Facility: HOSPITAL | Age: 65
End: 2025-06-23
Payer: COMMERCIAL

## 2025-06-23 RX ORDER — SODIUM CHLORIDE 9 MG/ML
20 INJECTION, SOLUTION INTRAVENOUS CONTINUOUS
Status: CANCELLED | OUTPATIENT
Start: 2025-06-23 | End: 2025-06-23

## 2025-06-24 ENCOUNTER — HOSPITAL ENCOUNTER (OUTPATIENT)
Dept: GASTROENTEROLOGY | Facility: HOSPITAL | Age: 65
Discharge: HOME | End: 2025-06-24
Payer: COMMERCIAL

## 2025-06-24 ENCOUNTER — ANESTHESIA (OUTPATIENT)
Dept: GASTROENTEROLOGY | Facility: HOSPITAL | Age: 65
End: 2025-06-24
Payer: COMMERCIAL

## 2025-06-24 VITALS
DIASTOLIC BLOOD PRESSURE: 85 MMHG | HEIGHT: 70 IN | TEMPERATURE: 97.9 F | RESPIRATION RATE: 12 BRPM | SYSTOLIC BLOOD PRESSURE: 109 MMHG | BODY MASS INDEX: 19.76 KG/M2 | HEART RATE: 68 BPM | OXYGEN SATURATION: 96 % | WEIGHT: 138 LBS

## 2025-06-24 DIAGNOSIS — R10.13 DYSPEPSIA: ICD-10-CM

## 2025-06-24 PROCEDURE — 3700000001 HC GENERAL ANESTHESIA TIME - INITIAL BASE CHARGE

## 2025-06-24 PROCEDURE — 2500000004 HC RX 250 GENERAL PHARMACY W/ HCPCS (ALT 636 FOR OP/ED): Mod: JW | Performed by: NURSE ANESTHETIST, CERTIFIED REGISTERED

## 2025-06-24 PROCEDURE — 7100000010 HC PHASE TWO TIME - EACH INCREMENTAL 1 MINUTE

## 2025-06-24 PROCEDURE — 43235 EGD DIAGNOSTIC BRUSH WASH: CPT | Performed by: INTERNAL MEDICINE

## 2025-06-24 PROCEDURE — 3700000002 HC GENERAL ANESTHESIA TIME - EACH INCREMENTAL 1 MINUTE

## 2025-06-24 PROCEDURE — 7100000009 HC PHASE TWO TIME - INITIAL BASE CHARGE

## 2025-06-24 PROCEDURE — 2500000004 HC RX 250 GENERAL PHARMACY W/ HCPCS (ALT 636 FOR OP/ED): Performed by: INTERNAL MEDICINE

## 2025-06-24 RX ORDER — SODIUM CHLORIDE 9 MG/ML
20 INJECTION, SOLUTION INTRAVENOUS CONTINUOUS
Status: ACTIVE | OUTPATIENT
Start: 2025-06-24 | End: 2025-06-24

## 2025-06-24 RX ORDER — LIDOCAINE HCL/PF 100 MG/5ML
SYRINGE (ML) INTRAVENOUS AS NEEDED
Status: DISCONTINUED | OUTPATIENT
Start: 2025-06-24 | End: 2025-06-24

## 2025-06-24 RX ORDER — PROPOFOL 10 MG/ML
INJECTION, EMULSION INTRAVENOUS AS NEEDED
Status: DISCONTINUED | OUTPATIENT
Start: 2025-06-24 | End: 2025-06-24

## 2025-06-24 RX ORDER — FENTANYL CITRATE 50 UG/ML
INJECTION, SOLUTION INTRAMUSCULAR; INTRAVENOUS AS NEEDED
Status: DISCONTINUED | OUTPATIENT
Start: 2025-06-24 | End: 2025-06-24

## 2025-06-24 RX ADMIN — LIDOCAINE HYDROCHLORIDE 40 MG: 20 INJECTION, SOLUTION INTRAVENOUS at 10:57

## 2025-06-24 RX ADMIN — SODIUM CHLORIDE 20 ML/HR: 0.9 INJECTION, SOLUTION INTRAVENOUS at 10:05

## 2025-06-24 RX ADMIN — PROPOFOL 140 MG: 10 INJECTION, EMULSION INTRAVENOUS at 10:57

## 2025-06-24 RX ADMIN — FENTANYL CITRATE 50 MCG: 50 INJECTION INTRAMUSCULAR; INTRAVENOUS at 10:57

## 2025-06-24 RX ADMIN — FENTANYL CITRATE 50 MCG: 50 INJECTION INTRAMUSCULAR; INTRAVENOUS at 10:58

## 2025-06-24 RX ADMIN — LIDOCAINE HYDROCHLORIDE 40 MG: 20 INJECTION, SOLUTION INTRAVENOUS at 10:58

## 2025-06-24 SDOH — HEALTH STABILITY: MENTAL HEALTH: CURRENT SMOKER: 1

## 2025-06-24 ASSESSMENT — COLUMBIA-SUICIDE SEVERITY RATING SCALE - C-SSRS
2. HAVE YOU ACTUALLY HAD ANY THOUGHTS OF KILLING YOURSELF?: NO
6. HAVE YOU EVER DONE ANYTHING, STARTED TO DO ANYTHING, OR PREPARED TO DO ANYTHING TO END YOUR LIFE?: NO
1. IN THE PAST MONTH, HAVE YOU WISHED YOU WERE DEAD OR WISHED YOU COULD GO TO SLEEP AND NOT WAKE UP?: NO

## 2025-06-24 ASSESSMENT — PAIN SCALES - GENERAL
PAINLEVEL_OUTOF10: 4
PAINLEVEL_OUTOF10: 0 - NO PAIN

## 2025-06-24 ASSESSMENT — PAIN - FUNCTIONAL ASSESSMENT
PAIN_FUNCTIONAL_ASSESSMENT: 0-10

## 2025-06-24 NOTE — ANESTHESIA PREPROCEDURE EVALUATION
Patient: Siva Priest    Procedure Information       Anesthesia Start Date/Time: 06/24/25 1051    Scheduled providers: Karsten Acharya MD    Procedure: EGD    Location: [facility]            Relevant Problems   Anesthesia (within normal limits)      Musculoskeletal   (+) DDD (degenerative disc disease), lumbar   (+) Spinal stenosis of lumbar region with neurogenic claudication       Clinical information reviewed:   Tobacco  Allergies  Meds   Med Hx  Surg Hx   Fam Hx  Soc Hx        NPO Detail:  NPO/Void Status  Date of Last Liquid: 06/24/25  Time of Last Liquid: 0400  Date of Last Solid: 06/23/25  Time of Last Solid: 1600  Last Intake Type: Clear fluids         Physical Exam    Airway  Mallampati: II     Cardiovascular - normal exam   Dental     (+) lower dentures, upper dentures     Pulmonary - normal exam   Abdominal            Anesthesia Plan    History of general anesthesia?: yes  History of complications of general anesthesia?: no    ASA 2     MAC     The patient is a current smoker.  Patient was previously instructed to abstain from smoking on day of procedure.  Patient did not smoke on day of procedure.    Anesthetic plan and risks discussed with patient.  Use of blood products discussed with who consented to blood products.

## 2025-06-24 NOTE — ANESTHESIA POSTPROCEDURE EVALUATION
Patient: Siva Priest    Procedure Summary       Date: 06/24/25 Room / Location: [facility]    Anesthesia Start: 1051 Anesthesia Stop: 1111    Procedure: EGD Diagnosis: Dyspepsia    Scheduled Providers: Karsten Acharya MD Responsible Provider: NESS Rosales    Anesthesia Type: MAC ASA Status: 2            Anesthesia Type: MAC    Vitals Value Taken Time   /85 06/24/25 11:29   Temp 36.6 °C (97.9 °F) 06/24/25 11:29   Pulse 68 06/24/25 11:29   Resp 12 06/24/25 11:29   SpO2 96 % 06/24/25 11:29       Anesthesia Post Evaluation    Patient location during evaluation: bedside  Patient participation: complete - patient participated  Level of consciousness: awake  Pain management: adequate  Airway patency: patent  Cardiovascular status: acceptable  Respiratory status: acceptable  Hydration status: acceptable  Postoperative Nausea and Vomiting: none        There were no known notable events for this encounter.

## 2025-06-25 NOTE — ADDENDUM NOTE
Encounter addended by: Carito Walton RN on: 6/25/2025 10:46 AM   Actions taken: Contacts section saved, Flowsheet accepted

## 2025-07-31 ENCOUNTER — APPOINTMENT (OUTPATIENT)
Dept: PRIMARY CARE | Facility: CLINIC | Age: 65
End: 2025-07-31
Payer: COMMERCIAL

## 2025-07-31 VITALS
WEIGHT: 131 LBS | OXYGEN SATURATION: 96 % | HEART RATE: 79 BPM | SYSTOLIC BLOOD PRESSURE: 132 MMHG | HEIGHT: 70 IN | BODY MASS INDEX: 18.75 KG/M2 | DIASTOLIC BLOOD PRESSURE: 88 MMHG

## 2025-07-31 DIAGNOSIS — Z01.818 PRE-OPERATIVE CLEARANCE: Primary | ICD-10-CM

## 2025-07-31 PROCEDURE — 3008F BODY MASS INDEX DOCD: CPT

## 2025-07-31 PROCEDURE — 99213 OFFICE O/P EST LOW 20 MIN: CPT

## 2025-07-31 ASSESSMENT — PATIENT HEALTH QUESTIONNAIRE - PHQ9
2. FEELING DOWN, DEPRESSED OR HOPELESS: SEVERAL DAYS
SUM OF ALL RESPONSES TO PHQ9 QUESTIONS 1 AND 2: 1
1. LITTLE INTEREST OR PLEASURE IN DOING THINGS: NOT AT ALL

## 2025-07-31 ASSESSMENT — ENCOUNTER SYMPTOMS
OCCASIONAL FEELINGS OF UNSTEADINESS: 0
BACK PAIN: 1
LOSS OF SENSATION IN FEET: 0
DEPRESSION: 0

## 2025-07-31 NOTE — PATIENT INSTRUCTIONS
Assessment/Plan   Problem List Items Addressed This Visit    None  Visit Diagnoses         Pre-operative clearance    -  Primary          High risk surgery: no  Hx of ischemic heart disease: no  Hx of CHF: no  HX CVA: no  Hx of insulin use: no  Pre-op creatinine: 0.80  Low risk cardiac risk pre-op risk

## 2025-07-31 NOTE — PROGRESS NOTES
"Subjective   Patient ID: Siva Priest is a 65 y.o. male who presents for Pre-op Exam (Surgical clearance, patient has already gotten his EKG, imaging, and lab work done. ).    Past Medical, Surgical, and Family History reviewed and updated in chart.     Reviewed all medications by prescribing practitioner or clinical pharmacist (such as prescriptions, OTCs, herbal therapies and supplements) and documented in the medical record.    HPI   65 yom in office for pre-op exam. PMH dyspepsia, alcohol use, nicotine dependence, lumbar spinal stenosis, nicotine dependence.  Patient in office for surgical clearance for revision laminectomy L4, L5, Fusion L4-5 and S1 with Dr. Roberto Quinones on 8/12/25.  Denies problems with Anesthesia  in the past or coming out of Anesthesia.  Lab work completed and ECG completed with [facility].  Going under general anesthesia.    Review of Systems   Musculoskeletal:  Positive for back pain and gait problem.       Objective   /88   Pulse 79   Ht 1.778 m (5' 10\")   Wt 59.4 kg (131 lb)   SpO2 96%   BMI 18.80 kg/m²     Physical Exam  Constitutional:       Appearance: Normal appearance.   HENT:      Head: Normocephalic and atraumatic.      Nose: Nose normal.      Mouth/Throat:      Mouth: Mucous membranes are moist.      Pharynx: Oropharynx is clear.     Eyes:      Pupils: Pupils are equal, round, and reactive to light.       Cardiovascular:      Rate and Rhythm: Normal rate and regular rhythm.      Pulses: Normal pulses.      Heart sounds: Normal heart sounds.   Pulmonary:      Effort: Pulmonary effort is normal.      Breath sounds: Normal breath sounds.   Abdominal:      General: Bowel sounds are normal.      Palpations: Abdomen is soft.     Musculoskeletal:         General: Normal range of motion.      Cervical back: Normal range of motion.     Skin:     General: Skin is warm and dry.     Neurological:      General: No focal deficit present.      Mental Status: He is alert " and oriented to person, place, and time.     Psychiatric:         Mood and Affect: Mood normal.         Behavior: Behavior normal.         Thought Content: Thought content normal.         Judgment: Judgment normal.         Assessment/Plan   Problem List Items Addressed This Visit    None  Visit Diagnoses         Pre-operative clearance    -  Primary          High risk surgery: no  Hx of ischemic heart disease: no  Hx of CHF: no  HX CVA: no  Hx of insulin use: no  Pre-op creatinine: 0.80  Low risk cardiac risk pre-op risk

## 2025-11-10 ENCOUNTER — APPOINTMENT (OUTPATIENT)
Dept: PRIMARY CARE | Facility: CLINIC | Age: 65
End: 2025-11-10
Payer: COMMERCIAL